# Patient Record
Sex: MALE | Race: WHITE | NOT HISPANIC OR LATINO | Employment: FULL TIME | URBAN - METROPOLITAN AREA
[De-identification: names, ages, dates, MRNs, and addresses within clinical notes are randomized per-mention and may not be internally consistent; named-entity substitution may affect disease eponyms.]

---

## 2023-09-09 ENCOUNTER — HOSPITAL ENCOUNTER (EMERGENCY)
Facility: HOSPITAL | Age: 23
Discharge: HOME | End: 2023-09-09
Attending: EMERGENCY MEDICINE
Payer: COMMERCIAL

## 2023-09-09 VITALS
OXYGEN SATURATION: 97 % | DIASTOLIC BLOOD PRESSURE: 84 MMHG | HEIGHT: 72 IN | BODY MASS INDEX: 30.48 KG/M2 | HEART RATE: 70 BPM | TEMPERATURE: 98.1 F | WEIGHT: 225 LBS | SYSTOLIC BLOOD PRESSURE: 135 MMHG | RESPIRATION RATE: 16 BRPM
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
## 2023-09-09 DIAGNOSIS — Z20.3 RABIES EXPOSURE: ICD-10-CM

## 2023-09-09 PROCEDURE — 90675 RABIES VACCINE IM: CPT

## 2023-09-09 PROCEDURE — 96372 THER/PROPH/DIAG INJ SC/IM: CPT

## 2023-09-09 PROCEDURE — 25010000002 RABIES VACCINE PER 1 ML

## 2023-09-09 PROCEDURE — 99282 EMERGENCY DEPT VISIT SF MDM: CPT

## 2023-09-09 PROCEDURE — 90375 RABIES IG IM/SC: CPT | Performed by: EMERGENCY MEDICINE

## 2023-09-09 PROCEDURE — 90471 IMMUNIZATION ADMIN: CPT

## 2023-09-09 PROCEDURE — 25010000002: Performed by: EMERGENCY MEDICINE

## 2023-09-09 PROCEDURE — 25010000002 RABIES IMMUNE GLOBULIN 300 UNIT/ML SOLUTION 1 ML VIAL: Performed by: EMERGENCY MEDICINE

## 2023-09-09 RX ORDER — CETIRIZINE HYDROCHLORIDE 10 MG/1
10 TABLET ORAL DAILY
COMMUNITY

## 2023-09-09 RX ADMIN — RABIES IMMUNE GLOBULIN (HUMAN) 2000 UNITS: 300 INJECTION, SOLUTION INFILTRATION; INTRAMUSCULAR at 17:26

## 2023-09-09 RX ADMIN — RABIES VACCINE 2.5 UNITS: KIT at 17:24

## 2023-09-09 NOTE — ED PROVIDER NOTES
"Time: 5:02 PM EDT  Date of encounter:  9/9/2023  Independent Historian/Clinical History and Information was obtained by:   Patient    History is limited by: N/A    Chief Complaint: Raccoon scratch      History of Present Illness:  Patient is a 23 y.o. year old male who presents to the emergency department for evaluation of right hand scratch.  Patient states approximately 48 hours ago he went to  a baby raccoon out of the box and it scratched his right hand.  Patient has not had any signs of infection including fever, drainage, tenderness.  Patient was informed that he likely needed rabies vaccine but this is why presented to the emergency department today.  Patient has no other complaints.    HPI    Patient Care Team  Primary Care Provider: Provider, No Known    Past Medical History:     No Known Allergies  Past Medical History:   Diagnosis Date    Seasonal allergic rhinitis      History reviewed. No pertinent surgical history.  History reviewed. No pertinent family history.    Home Medications:  Prior to Admission medications    Medication Sig Start Date End Date Taking? Authorizing Provider   cetirizine (zyrTEC) 10 MG tablet Take 1 tablet by mouth Daily.    Provider, Historical, MD        Social History:   Social History     Tobacco Use    Smoking status: Never   Vaping Use    Vaping Use: Never used   Substance Use Topics    Alcohol use: Never    Drug use: Never         Review of Systems:  Review of Systems   Skin:  Positive for wound.   All other systems reviewed and are negative.     Physical Exam:  /84 (BP Location: Left arm, Patient Position: Sitting)   Pulse 70   Temp 98.1 °F (36.7 °C) (Oral)   Resp 16   Ht 182.9 cm (72\")   Wt 102 kg (225 lb)   SpO2 97%   BMI 30.52 kg/m²     Physical Exam  Vitals and nursing note reviewed.   Constitutional:       Appearance: Normal appearance. He is normal weight.   HENT:      Head: Normocephalic and atraumatic.      Nose: Nose normal.   Eyes:      " Conjunctiva/sclera: Conjunctivae normal.      Pupils: Pupils are equal, round, and reactive to light.   Pulmonary:      Effort: Pulmonary effort is normal.   Abdominal:      General: Abdomen is flat. There is no distension.   Musculoskeletal:         General: Normal range of motion.      Cervical back: Normal range of motion and neck supple.   Skin:     General: Skin is warm and dry.      Comments: Right hand: Abrasion noted to the second MCP, no erythema, scabbing is present, no active drainage, no tenderness to palpation   Neurological:      General: No focal deficit present.      Mental Status: He is alert and oriented to person, place, and time.   Psychiatric:         Mood and Affect: Mood normal.         Behavior: Behavior normal.         Thought Content: Thought content normal.         Judgment: Judgment normal.              Procedures:  Procedures    Medical Decision Making:    Comorbidities that affect care:    None    External Notes reviewed:    None none available for review      The following orders were placed and all results were independently analyzed by me:  Orders Placed This Encounter   Procedures    Ambulatory Referral to [facility] For Infusion Treatment       Medications Given in the Emergency Department:  Medications   rabies vaccine, PCEC (RABAVERT) injection 2.5 Units (has no administration in time range)   Rabies Immune Globulin (HYPERRAB) 2,000 Units (has no administration in time range)        ED Course:         Labs:    Lab Results (last 24 hours)       ** No results found for the last 24 hours. **             Imaging:    No Radiology Exams Resulted Within Past 24 Hours      Differential Diagnosis and Discussion:    Raccoon scratch      MDM  Number of Diagnoses or Management Options  Diagnosis management comments: Patient presented to the emergency department today requesting rabies vaccine after a raccoon scratch.  Patient was provided with rabies vaccine and rabies immunoglobulin for  postexposure prophylaxis.  I discussed with patient this is multistep and he will have to receive other outpatient injections which I have sent referrals out for.    Risk of Complications, Morbidity, and/or Mortality  Presenting problems: low  Diagnostic procedures: low  Management options: low    Patient Progress  Patient progress: stable     Consultants/Shared Management Plan:    None    Social Determinants of Health:    Patient is independent, reliable, and has access to care.       Disposition and Care Coordination:    Discharged: The patient is suitable and stable for discharge with no need for consideration of observation or admission.    I have explained the patient´s condition, diagnoses and treatment plan based on the information available to me at this time. I have answered questions and addressed any concerns. The patient has a good  understanding of the patient´s diagnosis, condition, and treatment plan as can be expected at this point. The vital signs have been stable. The patient´s condition is stable and appropriate for discharge from the emergency department.      The patient will pursue further outpatient evaluation with the primary care physician or other designated or consulting physician as outlined in the discharge instructions. They are agreeable to this plan of care and follow-up instructions have been explained in detail. The patient has received these instructions in written format and have expressed an understanding of the discharge instructions. The patient is aware that any significant change in condition or worsening of symptoms should prompt an immediate return to this or the closest emergency department or call to 911.  I have explained discharge medications and the need for follow up with the patient/caretakers. This was also printed in the discharge instructions. Patient was discharged with the following medications and follow up:      Medication List      No changes were made to  your prescriptions during this visit.      [facility] OUTPATIENT INFUSION NON ONCOLOGY  [address] [place] [address] [phone]           Final diagnoses:   Struck by raccoon, initial encounter   Rabies exposure        ED Disposition       ED Disposition   Discharge    Condition   Stable    Comment   --               This medical record created using voice recognition software.             Yomi Vega PA-C  09/09/23 [id number]

## 2023-09-09 NOTE — DISCHARGE INSTRUCTIONS
Our outpatient treatment center should call you to set up the other vaccines that you will need for postexposure prophylaxis

## 2023-09-12 ENCOUNTER — HOSPITAL ENCOUNTER (OUTPATIENT)
Dept: INFUSION THERAPY | Facility: HOSPITAL | Age: 23
Discharge: HOME | End: 2023-09-12
Payer: COMMERCIAL

## 2023-09-12 VITALS
SYSTOLIC BLOOD PRESSURE: 132 MMHG | TEMPERATURE: 98.4 F | HEART RATE: 66 BPM | OXYGEN SATURATION: 98 % | DIASTOLIC BLOOD PRESSURE: 74 MMHG | RESPIRATION RATE: 18 BRPM
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
## 2023-09-12 PROCEDURE — 90471 IMMUNIZATION ADMIN: CPT

## 2023-09-12 PROCEDURE — 25010000002 RABIES VACCINE PER 1 ML

## 2023-09-12 PROCEDURE — 90675 RABIES VACCINE IM: CPT

## 2023-09-12 PROCEDURE — 96372 THER/PROPH/DIAG INJ SC/IM: CPT

## 2023-09-12 RX ADMIN — RABIES VACCINE 2.5 UNITS: KIT at 18:00

## 2023-09-16 ENCOUNTER — HOSPITAL ENCOUNTER (OUTPATIENT)
Dept: INFUSION THERAPY | Facility: HOSPITAL | Age: 23
Discharge: HOME | End: 2023-09-16
Payer: COMMERCIAL

## 2023-09-16 VITALS
TEMPERATURE: 97.6 F | RESPIRATION RATE: 16 BRPM | SYSTOLIC BLOOD PRESSURE: 130 MMHG | HEART RATE: 87 BPM | OXYGEN SATURATION: 98 % | DIASTOLIC BLOOD PRESSURE: 74 MMHG
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
## 2023-09-16 PROCEDURE — 25010000002 RABIES VACCINE PER 1 ML

## 2023-09-16 PROCEDURE — 90675 RABIES VACCINE IM: CPT

## 2023-09-16 PROCEDURE — 90471 IMMUNIZATION ADMIN: CPT

## 2023-09-16 RX ADMIN — RABIES VACCINE 2.5 UNITS: KIT at 12:25

## 2023-09-22 ENCOUNTER — HOSPITAL ENCOUNTER (OUTPATIENT)
Dept: INFUSION THERAPY | Facility: HOSPITAL | Age: 23
Discharge: HOME | End: 2023-09-22
Payer: COMMERCIAL

## 2023-09-22 VITALS
SYSTOLIC BLOOD PRESSURE: 145 MMHG | RESPIRATION RATE: 18 BRPM | OXYGEN SATURATION: 97 % | HEART RATE: 83 BPM | DIASTOLIC BLOOD PRESSURE: 78 MMHG | TEMPERATURE: 98.3 F
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
## 2023-09-22 PROCEDURE — 90471 IMMUNIZATION ADMIN: CPT

## 2023-09-22 PROCEDURE — 90675 RABIES VACCINE IM: CPT

## 2023-09-22 PROCEDURE — 25010000002 RABIES VACCINE PER 1 ML

## 2023-09-22 PROCEDURE — 96372 THER/PROPH/DIAG INJ SC/IM: CPT

## 2023-09-22 RX ADMIN — RABIES VACCINE 2.5 UNITS: KIT at 17:34
